# Patient Record
Sex: FEMALE | NOT HISPANIC OR LATINO | URBAN - NONMETROPOLITAN AREA
[De-identification: names, ages, dates, MRNs, and addresses within clinical notes are randomized per-mention and may not be internally consistent; named-entity substitution may affect disease eponyms.]

---

## 2023-10-26 ENCOUNTER — OFFICE (OUTPATIENT)
Dept: URBAN - NONMETROPOLITAN AREA CLINIC 1 | Facility: CLINIC | Age: 53
End: 2023-10-26

## 2023-10-26 VITALS
SYSTOLIC BLOOD PRESSURE: 148 MMHG | HEART RATE: 72 BPM | HEIGHT: 66 IN | DIASTOLIC BLOOD PRESSURE: 78 MMHG | WEIGHT: 272 LBS

## 2023-10-26 DIAGNOSIS — Z12.11 ENCOUNTER FOR SCREENING FOR MALIGNANT NEOPLASM OF COLON: ICD-10-CM

## 2023-10-26 DIAGNOSIS — Z79.1 LONG TERM (CURRENT) USE OF NON-STEROIDAL ANTI-INFLAMMATORIES: ICD-10-CM

## 2023-10-26 DIAGNOSIS — I10 ESSENTIAL (PRIMARY) HYPERTENSION: ICD-10-CM

## 2023-10-26 DIAGNOSIS — E78.5 HYPERLIPIDEMIA, UNSPECIFIED: ICD-10-CM

## 2023-10-26 PROCEDURE — S0285 CNSLT BEFORE SCREEN COLONOSC: HCPCS | Performed by: NURSE PRACTITIONER

## 2023-10-26 NOTE — SERVICEHPINOTES
Patient presents to the clinic today for colonoscopy screening.  She has never had a colonoscopy in the past.  Has 1 BM daily and a good appetite.  Denies any melena, hematochezia, n/v/d, unintentional weight loss, or fatigue.  Denies any family history of colon cancer.   Take Aleve po for pain.  Denies anticoagulant therapy.  Chronic illnesses include DMII, HLD, HTN, and vit d deficiency.

## 2024-03-01 ENCOUNTER — ON CAMPUS - OUTPATIENT (OUTPATIENT)
Dept: URBAN - NONMETROPOLITAN AREA HOSPITAL 34 | Facility: HOSPITAL | Age: 54
End: 2024-03-01
Payer: COMMERCIAL

## 2024-03-01 DIAGNOSIS — Z53.8 PROCEDURE AND TREATMENT NOT CARRIED OUT FOR OTHER REASONS: ICD-10-CM

## 2024-03-01 DIAGNOSIS — Z12.11 ENCOUNTER FOR SCREENING FOR MALIGNANT NEOPLASM OF COLON: ICD-10-CM

## 2024-03-01 PROCEDURE — 45378 DIAGNOSTIC COLONOSCOPY: CPT | Mod: 33,53 | Performed by: INTERNAL MEDICINE

## 2025-07-17 ENCOUNTER — OFFICE (OUTPATIENT)
Dept: URBAN - NONMETROPOLITAN AREA CLINIC 1 | Facility: CLINIC | Age: 55
End: 2025-07-17
Payer: COMMERCIAL

## 2025-07-17 VITALS
HEIGHT: 66 IN | HEART RATE: 67 BPM | WEIGHT: 274 LBS | SYSTOLIC BLOOD PRESSURE: 139 MMHG | DIASTOLIC BLOOD PRESSURE: 78 MMHG

## 2025-07-17 DIAGNOSIS — Z12.11 ENCOUNTER FOR SCREENING FOR MALIGNANT NEOPLASM OF COLON: ICD-10-CM

## 2025-07-17 PROCEDURE — S0285 CNSLT BEFORE SCREEN COLONOSC: HCPCS | Performed by: NURSE PRACTITIONER

## 2025-07-17 NOTE — SERVICEHPINOTES
Patient with a history of DMII, HTN, HLD, SVT, RENETTA-CPAP presents to the clinic today for colonoscopy consult.  She underwent colonoscopy 3/1/24 screening for crc, however, poor prep, recommendations to repeat colonoscopy screening in 6-12 months.  Denies melena, hematochezia, n/v/d, abdominal pain, unintentional weight loss, change in BM, or fever.  She has a good appetite and has a BM at least once daily-#3[facility] Stool chart, denies straining with defecation.  Has had SVT run 5/2025 -ER, and is now seeing Dr. Rice cardiologist has had cardiac workup recently (no results to review)-had ECHO today, and has 2 other cardiac tests she has to undergo in the near future as well.  wears a RENETTA-CPAP, denies cardiac stents, anticoagulation therapy, ckd, Mother history of colon polyps, denies family history of crc.  denies tobacco use. br br
brColonoscopy by Dr. Kaur 3/1/2024brpoor prep, views poor, terminal ileum normal, large amount of liquid and adherent stool noted throughout colon, repeat 6-12 months w/ 2 day bowel prep  
    Per DANIELA Pineda 10/2023
brPatient presents to the clinic today for colonoscopy screening. She has never had a colonoscopy in the past. Has 1 BM daily and a good appetite. Denies any melena, hematochezia, n/v/d, unintentional weight loss, or fatigue. Denies any family history of colon cancer.  Take Aleve po for pain. Denies anticoagulant therapy. Chronic illnesses include DMII, HLD, HTN, and vit d deficiency.

## 2025-07-17 NOTE — SERVICENOTES
Risks of procedure explained to patient and she wishes to proceed
2 day bowel prep prescribed and instructions given to patient
Colonoscopy-screening crc-previous colonoscopy screening 2024-poor prep, cannot serve as screening
She is currently under cardiac workup, DEJAN today, and other cardiac tests pending for recent SVT -will need clearance via Dr. Rice cardiologist prior to scheduling procedure. 
Increase fiber and water intake daily
Avoid nsaids
She cannot have scope prior to 9/2025 d/t work she reports.